# Patient Record
Sex: FEMALE | Race: WHITE | NOT HISPANIC OR LATINO | Employment: OTHER | URBAN - METROPOLITAN AREA
[De-identification: names, ages, dates, MRNs, and addresses within clinical notes are randomized per-mention and may not be internally consistent; named-entity substitution may affect disease eponyms.]

---

## 2017-10-12 ENCOUNTER — APPOINTMENT (OUTPATIENT)
Dept: CT IMAGING | Facility: HOSPITAL | Age: 37
End: 2017-10-12

## 2017-10-12 ENCOUNTER — HOSPITAL ENCOUNTER (EMERGENCY)
Facility: HOSPITAL | Age: 37
Discharge: HOME | End: 2017-10-12
Attending: EMERGENCY MEDICINE | Admitting: EMERGENCY MEDICINE

## 2017-10-12 VITALS
OXYGEN SATURATION: 98 % | TEMPERATURE: 98.7 F | RESPIRATION RATE: 18 BRPM | DIASTOLIC BLOOD PRESSURE: 66 MMHG | WEIGHT: 115 LBS | SYSTOLIC BLOOD PRESSURE: 122 MMHG | BODY MASS INDEX: 21.16 KG/M2 | HEIGHT: 62 IN | HEART RATE: 78 BPM

## 2017-10-12 DIAGNOSIS — S02.32XK: Primary | Chronic | ICD-10-CM

## 2017-10-12 PROCEDURE — 99284 EMERGENCY DEPT VISIT MOD MDM: CPT | Performed by: EMERGENCY MEDICINE

## 2017-10-12 PROCEDURE — 99283 EMERGENCY DEPT VISIT LOW MDM: CPT

## 2017-10-12 PROCEDURE — 70486 CT MAXILLOFACIAL W/O DYE: CPT

## 2017-10-12 RX ORDER — METRONIDAZOLE 500 MG/1
500 TABLET ORAL 3 TIMES DAILY
Qty: 21 TABLET | Refills: 0 | Status: SHIPPED | OUTPATIENT
Start: 2017-10-12 | End: 2017-10-19

## 2017-10-12 RX ORDER — AMOXICILLIN 875 MG/1
875 TABLET, COATED ORAL 2 TIMES DAILY
Qty: 10 TABLET | Refills: 0 | Status: SHIPPED | OUTPATIENT
Start: 2017-10-12 | End: 2017-10-17

## 2017-10-12 RX ORDER — HYDROCODONE BITARTRATE AND ACETAMINOPHEN 5; 325 MG/1; MG/1
1 TABLET ORAL EVERY 4 HOURS PRN
Qty: 15 TABLET | Refills: 0 | Status: SHIPPED | OUTPATIENT
Start: 2017-10-12

## 2017-10-12 NOTE — ED PROVIDER NOTES
Subjective   History of Present Illness  History of Present Illness    Chief complaint: Facial pain and swelling    Location: Left face    Quality/Severity:  Dull ache and moderate severity    Timing/Duration: Approximately 1 week    Modifying Factors: Left facial fractures in May 2017 and patient was scheduled for surgery that the patient did not have.    Associated Symptoms: Discharge from left ear    Narrative: The patient is a 37-year-old white female who presents as noted above.  The patient had some reported left facial fractures sustained in May of this year.  The patient was scheduled for surgery, but the patient did not have that surgery.  Approximately 1 week ago the patient began to have some left facial swelling for which her dentist placed the patient on antibiotics.  The patient then sustained a another injury to the left side of her face when she was accidentally elbowed.  The patient now complaints of increased swelling and pain despite being on antibiotics.  The patient was seen by her dentist today and referred to our department for further evaluation/treatment.    Review of Systems   Constitutional: Negative for fever.   HENT: Positive for ear discharge (left), ear pain ( left) and facial swelling (left). Negative for nosebleeds, postnasal drip, rhinorrhea, sore throat, trouble swallowing and voice change.    Cardiovascular: Negative for chest pain.   All other systems reviewed and are negative.      Past Medical History:   Diagnosis Date   • Kidney stone        No Known Allergies    Past Surgical History:   Procedure Laterality Date   • CHOLECYSTECTOMY     • KNEE SURGERY      reports multiple from accident as child   • KNEE SURGERY         History reviewed. No pertinent family history.    Social History     Social History   • Marital status: Single     Spouse name: N/A   • Number of children: N/A   • Years of education: N/A     Social History Main Topics   • Smoking status: Never Smoker   •  Smokeless tobacco: Never Used   • Alcohol use No   • Drug use: No   • Sexual activity: Defer     Other Topics Concern   • None     Social History Narrative   • None           Objective   Physical Exam   HENT:   Nose: Nose normal.   Mouth/Throat: Oropharynx is clear and moist.   Bilateral myringotomy tubes.  Small amount of discharge noted in the left EAC.  Dentition does not show any obvious findings.  Mild, diffuse left facial swelling without any significant erythema or induration   Neck: Normal range of motion. Neck supple.   Lymphadenopathy:     She has no cervical adenopathy.       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  Gaetano report reviewed and quite unremarkable.  All findings explained to the patient and her amoxicillin will be continued with Flagyl added.  Patient advised to follow-up with both ENT and dentistry. 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 **        Prescribed on discharge             Medication List      New Prescriptions          amoxicillin 875 MG tablet   Commonly known as:  AMOXIL   Take 1 tablet by mouth 2 (Two) Times a Day for 5 days.       HYDROcodone-acetaminophen 5-325 MG per tablet    Commonly known as:  NORCO   Take 1 tablet by mouth Every 4 (Four) Hours As Needed for Moderate Pain    for up to 15 doses.       metroNIDAZOLE 500 MG tablet   Commonly known as:  FLAGYL   Take 1 tablet by mouth 3 (Three) Times a Day for 7 days.           All lab results, imaging results and other tests were reviewed by Zach Whelan MD and unless otherwise specified were found to be unremarkable.      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

## 2021-11-11 ENCOUNTER — LAB REQUISITION (OUTPATIENT)
Dept: LAB | Facility: HOSPITAL | Age: 41
End: 2021-11-11

## 2021-11-11 DIAGNOSIS — Z00.00 ENCOUNTER FOR GENERAL ADULT MEDICAL EXAMINATION WITHOUT ABNORMAL FINDINGS: ICD-10-CM

## 2021-11-11 LAB — SARS-COV-2 ORF1AB RESP QL NAA+PROBE: NOT DETECTED

## 2021-11-11 PROCEDURE — U0004 COV-19 TEST NON-CDC HGH THRU: HCPCS | Performed by: PLASTIC SURGERY

## 2022-08-11 ENCOUNTER — TELEPHONE (OUTPATIENT)
Dept: OBSTETRICS AND GYNECOLOGY | Facility: CLINIC | Age: 42
End: 2022-08-11

## 2022-08-11 NOTE — TELEPHONE ENCOUNTER
Provider: MATHEW KRISHNAN  Caller: April CRICK  Relationship to Patient: SELF  Phone Number: [phone]    Reason for Call: SAME DAY CANCELLATION      PATIENT CALLED TO CANCEL NEW GYN APPT WITH MAMMOGRAM FOR 1:15 WITH MATHEW KRISHNAN    PATIENT WAS EXPOSED TO COVID19 AND DID NOT RESCHEDULE. PT WILL CALL AND RESCHEDULE AT A LATER TIME.

## 2024-08-15 ENCOUNTER — TELEPHONE (OUTPATIENT)
Dept: OBSTETRICS AND GYNECOLOGY | Facility: CLINIC | Age: 44
End: 2024-08-15
Payer: COMMERCIAL